# Patient Record
Sex: MALE | Race: WHITE | NOT HISPANIC OR LATINO | Employment: OTHER | URBAN - METROPOLITAN AREA
[De-identification: names, ages, dates, MRNs, and addresses within clinical notes are randomized per-mention and may not be internally consistent; named-entity substitution may affect disease eponyms.]

---

## 2018-04-17 ENCOUNTER — HISTORICAL (OUTPATIENT)
Dept: INTERNAL MEDICINE | Facility: CLINIC | Age: 53
End: 2018-04-17

## 2018-04-18 ENCOUNTER — HISTORICAL (OUTPATIENT)
Dept: INTERNAL MEDICINE | Facility: CLINIC | Age: 53
End: 2018-04-18

## 2018-04-19 ENCOUNTER — HISTORICAL (OUTPATIENT)
Dept: INTERNAL MEDICINE | Facility: CLINIC | Age: 53
End: 2018-04-19

## 2018-12-17 ENCOUNTER — HISTORICAL (OUTPATIENT)
Dept: CARDIOLOGY | Facility: HOSPITAL | Age: 53
End: 2018-12-17

## 2018-12-20 ENCOUNTER — HISTORICAL (OUTPATIENT)
Dept: RADIOLOGY | Facility: HOSPITAL | Age: 53
End: 2018-12-20

## 2019-04-02 LAB
BILIRUB SERPL-MCNC: NEGATIVE MG/DL
BLOOD URINE, POC: NEGATIVE
CLARITY, POC UA: CLEAR
COLOR, POC UA: NORMAL
GLUCOSE UR QL STRIP: NEGATIVE
KETONES UR QL STRIP: NEGATIVE
LEUKOCYTE EST, POC UA: NORMAL
NITRITE, POC UA: NEGATIVE
PH, POC UA: 7.5
PROTEIN, POC: NORMAL
SPECIFIC GRAVITY, POC UA: 1.02
UROBILINOGEN, POC UA: NORMAL

## 2019-05-06 ENCOUNTER — HISTORICAL (OUTPATIENT)
Dept: INTERNAL MEDICINE | Facility: CLINIC | Age: 54
End: 2019-05-06

## 2019-05-08 LAB — FINAL CULTURE: NO GROWTH

## 2019-05-16 ENCOUNTER — HISTORICAL (OUTPATIENT)
Dept: ENDOSCOPY | Facility: HOSPITAL | Age: 54
End: 2019-05-16

## 2021-12-10 ENCOUNTER — HISTORICAL (OUTPATIENT)
Dept: RADIOLOGY | Facility: HOSPITAL | Age: 56
End: 2021-12-10

## 2022-04-09 ENCOUNTER — HISTORICAL (OUTPATIENT)
Dept: ADMINISTRATIVE | Facility: HOSPITAL | Age: 57
End: 2022-04-09

## 2022-04-25 VITALS
HEIGHT: 70 IN | DIASTOLIC BLOOD PRESSURE: 88 MMHG | OXYGEN SATURATION: 96 % | WEIGHT: 311.75 LBS | BODY MASS INDEX: 44.63 KG/M2 | SYSTOLIC BLOOD PRESSURE: 150 MMHG

## 2022-05-02 NOTE — HISTORICAL [facility] CERNER
This is a historical note converted from Binh. Formatting and pictures may have been removed.  Please reference Binh for original formatting and attached multimedia. History of Present Illness  54 year old WM with PMH below who presents for referral from  clinic for colonoscopy following +FIT test.  ?  Patient states he?has?had?multiple colonoscopies?since 1989.? Initially was performed for abdominal pain and found polyps. ?Subsequently has had?repeated follow up colonoscopies with polypectomies last 2015 at which time 3 polyps removed.? Says he has never had a malignant polyp.? Last had EGD in 2017?for GERD symptoms?and?dysphagia,?polypectomy and dilatation was performed. ?He is followed in internal medicine clinic and had a?positive fit test?04/2018. ?He was seen subsequently scheduled for colonoscopy today.  ?  Today he has no complaints. ?He does have a history of sleep apnea and says his sats go down to the low 90s at nighttime?however he has never had any complications during his colonoscopies or surgeries.? Reports daily bowel movement.? States he completed 2 day prep last bowel movement just prior to interview yellowish clear.? Had 16 ounces water 3?a.m.?with his medications.  ?  Says he took all his regular medications yesterday including aspirin.? Only took clonazepam, HCTZ, metoprolol, sertraline, Nexium?this morning.? Also took his?Striverdi?for COPD and asthma.  ?  PMH: KAILEY, anxiety, depression, COPD,?asthma, diabetes, hypertension, OCD, gastroparesis, diverticulosis  FHx:sister?and niece with Crohns disease, brother with polyps  Social Hx:?History of smoking  SurgHx:?Sinus surgery, gallbladder surgery, knee surgery  All:?Iodine, cortisone  Meds: reviewed in chart  ?  ?  Review of Systems  neg except as above  Physical Exam  Vitals & Measurements  T:?36.6? ?C (Oral)? HR:?68(Monitored)? RR:?18? BP:?125/75? SpO2:?95%? WT:?133.9?kg?  General: Alert and oriented, No acute distress. Morbid  obesity  Eye:? Extraocular movements are intact.  HENT: Normocephalic. Obese neck  Neck: Supple  Respiratory: Lungs are clear to auscultation, Respirations are non-labored, Breath sounds are equal, Symmetrical chest wall expansion. No wheeze  Cardiovascular: Normal rate, Regular rhythm, No murmur.  Gastrointestinal: Soft, Non-tender, Non-distended  Musculoskeletal: Normal range of motion.  Integumentary: Warm, Dry, Intact.  Neurologic: No focal deficits  Assessment/Plan  1.?Encounter for diagnostic endoscopy?Z01.818  ?-informed consent obtained, colonoscopy today, procedure note to follow  -follow up results with PCP  2.?Positive FIT (fecal immunochemical test)?R19.5   Problem List/Past Medical History  Ongoing  Anxiety  Asthma  COPD (chronic obstructive pulmonary disease) with emphysema  Depression  Diabetic gastroparesis  DM (diabetes mellitus)  HTN (hypertension)  OCD - Obsessive-compulsive disorder  Historical  Asthma  DM (diabetes mellitus)  Hypertension  Procedure/Surgical History  Septoplasty or submucous resection, with or without cartilage scoring, contouring or replacement with graft (06/01/2017)  COLON POLYPS REMOVED (01/02/2017)  GALLL BLADDER REMOVED  RIGHT KNEE SCOPE   Medications  Inpatient  IVF Lactated Ringers LR Infusion 1,000 mL, 1000 mL, IV  Home  Asmanex Twisthaler 60 Dose 220 mcg/inh inhalation aerosol powder, INH, BID  aspirin 81 mg oral tablet, 81 mg= 1 tab(s), Oral, Daily  Atrovent HFA 17 mcg/inh inhalation aerosol, 2 puff(s), INH, QID  azelastine 137 mcg/inh (0.1%) nasal spray, 1 spray(s), Nasal, BID  Carafate 1 g oral tablet, 1 gm= 1 tab(s), Oral, QID  cholecalciferol 2000 intl units oral capsule, 2000 IntUnit= 1 cap(s), Oral, Daily  cloniDINE 0.1 mg oral tablet, 0.2 mg= 2 tab(s), Oral, BID  cyanocobalamin 1000 mcg oral tablet, 1000 mcg= 1 tab(s), Oral, Daily  fexofenadine 180 mg oral tablet, 180 mg= 1 tab(s), Oral, Daily  Flomax 0.4 mg oral capsule, 0.4 mg= 1 cap(s), Oral, Daily  Flonase  50 mcg/inh nasal spray, 2 spray(s), Nasal, Daily, 2 refills  fosinopril 40 mg oral tablet, 40 mg= 1 tab(s), Oral, BID  gabapentin 300 mg oral capsule, 300 mg= 1 cap(s), Oral, TID,? ?Still taking, not as prescribed: patient states he is taking BID  glipiZIDE 2.5 mg oral tablet, extended release (XL tab), 2.5 mg= 1 tab(s), Oral, BID  hydrochlorothiazide 25 mg oral tablet, 25 mg= 1 tab(s), Oral, Daily  Klonopin 1 mg oral tablet, 1 mg= 1 tab(s), Oral, TID  Lidocaine Viscous 2% mucous membrane solution, 1 anselmo, TOP, QID, PRN  metformin 1000 mg oral tablet, 1000 mg= 1 tab(s), Oral, BID  metoprolol tartrate 25 mg oral tab, 25 mg= 1 tab(s), Oral, BID  mometasone 220 mcg/inh inhalation aerosol powder, 2 puff(s), INH, BID  montelukast 10 mg oral TABLET, 10 mg= 1 tab(s), Oral, Daily  NexIUM 40 mg oral delayed release capsule, 40 mg, Oral, Daily  QUEtiapine 100 mg oral tablet, See Instructions  sertraline 100 mg oral tablet, 200 mg= 2 tab(s), Oral, Daily  simvastatin 40 mg oral tablet, 40 mg= 1 tab(s), Oral, Once a day (at bedtime)  Striverdi Respimat 2.5 mcg/inh inhalation aerosol, 2 puff(s), INH, BID  testosterone 20.25 mg/actuation (1.62%) transdermal gel, 4 pump, TOP, qAM  valproic acid 250 mg oral capsule, 1 tab, Oral, BID  Xopenex 1.25 mg/3 mL inhalation solution, 1.25 mg= 3 mL, NEB, TID  Allergies  Cortisone+  iodine  Social History  Alcohol - Denies Alcohol Use, 07/06/2014  Never, 05/16/2019  Employment/School  Unemployed, 05/10/2016  Home/Environment  Lives with Spouse. Home equipment: Glucose monitoring, Respiratory treatments, AL[employer] MACHINE. Alcohol abuse in household: No. Substance abuse in household: No. Smoker in household: No. Feels unsafe at home: No. Safe place to go: Yes. Family/Friends available for support: Yes., 05/10/2016  Nutrition/Health  Regular, AVOID RAW VEGATABLES AND FRUITS, Caffeine intake amount: 1-2 CUPS OF COOFFEE DAILY. Wants to lose weight: Yes. Sleeping concerns: Yes. Feels highly  stressed: Yes., 05/10/2016  Substance Abuse - Denies Substance Abuse, 07/06/2014  Never, 05/16/2019  Tobacco - Denies Tobacco Use, 07/06/2014  Former smoker, quit more than 30 days ago, Cigarettes, N/A, 05/16/2019  Family History  Asthma.: Mother and Father.  Diabetes: Mother.  Hypertension.: Mother and Father.  Mitral valve disorder.: Mother and Father.  Renal disease.: Father.  Immunizations  Vaccine Date Status   influenza virus vaccine, inactivated 09/25/2018 Given

## 2022-09-15 ENCOUNTER — HISTORICAL (OUTPATIENT)
Dept: ADMINISTRATIVE | Facility: HOSPITAL | Age: 57
End: 2022-09-15

## 2023-08-24 ENCOUNTER — HOSPITAL ENCOUNTER (EMERGENCY)
Facility: HOSPITAL | Age: 58
Discharge: HOME | End: 2023-08-24
Attending: INTERNAL MEDICINE
Payer: OTHER GOVERNMENT

## 2023-08-24 VITALS
WEIGHT: 305.75 LBS | OXYGEN SATURATION: 95 % | HEIGHT: 69 IN | RESPIRATION RATE: 18 BRPM | SYSTOLIC BLOOD PRESSURE: 127 MMHG | TEMPERATURE: 99 F | BODY MASS INDEX: 45.29 KG/M2 | HEART RATE: 98 BPM | DIASTOLIC BLOOD PRESSURE: 88 MMHG

## 2023-08-24 DIAGNOSIS — N30.90 CYSTITIS WITHOUT HEMATURIA: Primary | ICD-10-CM

## 2023-08-24 DIAGNOSIS — L03.818 CELLULITIS OF OTHER SPECIFIED SITE: ICD-10-CM

## 2023-08-24 LAB
ALBUMIN SERPL-MCNC: 3.8 G/DL (ref 3.5–5)
ALBUMIN/GLOB SERPL: 1 RATIO (ref 1.1–2)
ALP SERPL-CCNC: 71 UNIT/L (ref 40–150)
ALT SERPL-CCNC: 28 UNIT/L (ref 0–55)
APPEARANCE UR: CLEAR
AST SERPL-CCNC: 20 UNIT/L (ref 5–34)
BACTERIA #/AREA URNS AUTO: ABNORMAL /HPF
BASOPHILS # BLD AUTO: 0.08 X10(3)/MCL
BASOPHILS NFR BLD AUTO: 0.5 %
BILIRUB SERPL-MCNC: 0.5 MG/DL
BILIRUB UR QL STRIP.AUTO: NEGATIVE
BUN SERPL-MCNC: 10.5 MG/DL (ref 8.4–25.7)
C TRACH DNA SPEC QL NAA+PROBE: NOT DETECTED
CALCIUM SERPL-MCNC: 9.4 MG/DL (ref 8.4–10.2)
CHLORIDE SERPL-SCNC: 99 MMOL/L (ref 98–107)
CO2 SERPL-SCNC: 25 MMOL/L (ref 22–29)
COLOR UR: ABNORMAL
CREAT SERPL-MCNC: 1.13 MG/DL (ref 0.73–1.18)
EOSINOPHIL # BLD AUTO: 0.39 X10(3)/MCL (ref 0–0.9)
EOSINOPHIL NFR BLD AUTO: 2.2 %
ERYTHROCYTE [DISTWIDTH] IN BLOOD BY AUTOMATED COUNT: 16.9 % (ref 11.5–17)
GFR SERPLBLD CREATININE-BSD FMLA CKD-EPI: >60 MLS/MIN/1.73/M2
GLOBULIN SER-MCNC: 3.9 GM/DL (ref 2.4–3.5)
GLUCOSE SERPL-MCNC: 233 MG/DL (ref 74–100)
GLUCOSE UR QL STRIP.AUTO: ABNORMAL
HCT VFR BLD AUTO: 42.2 % (ref 42–52)
HGB BLD-MCNC: 12.4 G/DL (ref 14–18)
HYALINE CASTS #/AREA URNS LPF: ABNORMAL /LPF
IMM GRANULOCYTES # BLD AUTO: 0.07 X10(3)/MCL (ref 0–0.04)
IMM GRANULOCYTES NFR BLD AUTO: 0.4 %
KETONES UR QL STRIP.AUTO: NEGATIVE
LEUKOCYTE ESTERASE UR QL STRIP.AUTO: 500
LYMPHOCYTES # BLD AUTO: 1.77 X10(3)/MCL (ref 0.6–4.6)
LYMPHOCYTES NFR BLD AUTO: 10 %
MCH RBC QN AUTO: 21.9 PG (ref 27–31)
MCHC RBC AUTO-ENTMCNC: 29.4 G/DL (ref 33–36)
MCV RBC AUTO: 74.6 FL (ref 80–94)
MONOCYTES # BLD AUTO: 2.02 X10(3)/MCL (ref 0.1–1.3)
MONOCYTES NFR BLD AUTO: 11.4 %
MUCOUS THREADS URNS QL MICRO: ABNORMAL /LPF
N GONORRHOEA DNA SPEC QL NAA+PROBE: NOT DETECTED
NEUTROPHILS # BLD AUTO: 13.41 X10(3)/MCL (ref 2.1–9.2)
NEUTROPHILS NFR BLD AUTO: 75.5 %
NITRITE UR QL STRIP.AUTO: ABNORMAL
NRBC BLD AUTO-RTO: 0 %
PH UR STRIP.AUTO: 7 [PH]
PLATELET # BLD AUTO: 336 X10(3)/MCL (ref 130–400)
PMV BLD AUTO: 9.9 FL (ref 7.4–10.4)
POTASSIUM SERPL-SCNC: 4.5 MMOL/L (ref 3.5–5.1)
PROT SERPL-MCNC: 7.7 GM/DL (ref 6.4–8.3)
PROT UR QL STRIP.AUTO: NEGATIVE
RBC # BLD AUTO: 5.66 X10(6)/MCL (ref 4.7–6.1)
RBC #/AREA URNS AUTO: ABNORMAL /HPF
RBC UR QL AUTO: NEGATIVE
SODIUM SERPL-SCNC: 132 MMOL/L (ref 136–145)
SOURCE (OHS): NORMAL
SP GR UR STRIP.AUTO: 1.01
SQUAMOUS #/AREA URNS LPF: ABNORMAL /HPF
UROBILINOGEN UR STRIP-ACNC: NORMAL
WBC # SPEC AUTO: 17.74 X10(3)/MCL (ref 4.5–11.5)
WBC #/AREA URNS AUTO: ABNORMAL /HPF

## 2023-08-24 PROCEDURE — 80053 COMPREHEN METABOLIC PANEL: CPT | Performed by: PHYSICIAN ASSISTANT

## 2023-08-24 PROCEDURE — 96365 THER/PROPH/DIAG IV INF INIT: CPT

## 2023-08-24 PROCEDURE — 85025 COMPLETE CBC W/AUTO DIFF WBC: CPT | Performed by: PHYSICIAN ASSISTANT

## 2023-08-24 PROCEDURE — 25000003 PHARM REV CODE 250

## 2023-08-24 PROCEDURE — 63600175 PHARM REV CODE 636 W HCPCS

## 2023-08-24 PROCEDURE — 87591 N.GONORRHOEAE DNA AMP PROB: CPT | Performed by: PHYSICIAN ASSISTANT

## 2023-08-24 PROCEDURE — 99284 EMERGENCY DEPT VISIT MOD MDM: CPT | Mod: 25

## 2023-08-24 PROCEDURE — 81001 URINALYSIS AUTO W/SCOPE: CPT | Performed by: PHYSICIAN ASSISTANT

## 2023-08-24 PROCEDURE — 87088 URINE BACTERIA CULTURE: CPT | Performed by: PHYSICIAN ASSISTANT

## 2023-08-24 PROCEDURE — 87186 SC STD MICRODIL/AGAR DIL: CPT | Performed by: PHYSICIAN ASSISTANT

## 2023-08-24 RX ORDER — SULFAMETHOXAZOLE AND TRIMETHOPRIM 800; 160 MG/1; MG/1
1 TABLET ORAL EVERY 12 HOURS
Qty: 20 TABLET | Refills: 0 | Status: SHIPPED | OUTPATIENT
Start: 2023-08-24 | End: 2023-09-03

## 2023-08-24 RX ADMIN — SODIUM CHLORIDE, POTASSIUM CHLORIDE, SODIUM LACTATE AND CALCIUM CHLORIDE 500 ML: 600; 310; 30; 20 INJECTION, SOLUTION INTRAVENOUS at 04:08

## 2023-08-24 RX ADMIN — PIPERACILLIN AND TAZOBACTAM 4.5 G: 4; .5 INJECTION, POWDER, LYOPHILIZED, FOR SOLUTION INTRAVENOUS; PARENTERAL at 05:08
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
## 2023-08-26 LAB — BACTERIA UR CULT: ABNORMAL

## 2024-06-24 ENCOUNTER — HOSPITAL ENCOUNTER (EMERGENCY)
Facility: HOSPITAL | Age: 59
Discharge: HOME | End: 2024-06-24
Attending: FAMILY MEDICINE
Payer: OTHER GOVERNMENT

## 2024-06-24 VITALS
HEIGHT: 69 IN | SYSTOLIC BLOOD PRESSURE: 131 MMHG | TEMPERATURE: 99 F | OXYGEN SATURATION: 94 % | BODY MASS INDEX: 45.29 KG/M2 | WEIGHT: 305.75 LBS | HEART RATE: 95 BPM | RESPIRATION RATE: 23 BRPM | DIASTOLIC BLOOD PRESSURE: 78 MMHG

## 2024-06-24 DIAGNOSIS — K21.9 GASTROESOPHAGEAL REFLUX DISEASE, UNSPECIFIED WHETHER ESOPHAGITIS PRESENT: ICD-10-CM

## 2024-06-24 DIAGNOSIS — R07.89 ATYPICAL CHEST PAIN: Primary | ICD-10-CM

## 2024-06-24 DIAGNOSIS — I10 HYPERTENSION: ICD-10-CM

## 2024-06-24 LAB
ALBUMIN SERPL-MCNC: 3.6 G/DL (ref 3.5–5)
ALBUMIN/GLOB SERPL: 1.1 RATIO (ref 1.1–2)
ALP SERPL-CCNC: 81 UNIT/L (ref 40–150)
ALT SERPL-CCNC: 26 UNIT/L (ref 0–55)
ANION GAP SERPL CALC-SCNC: 10 MEQ/L
AST SERPL-CCNC: 23 UNIT/L (ref 5–34)
BASOPHILS # BLD AUTO: 0.06 X10(3)/MCL
BASOPHILS NFR BLD AUTO: 0.6 %
BILIRUB SERPL-MCNC: 0.3 MG/DL
BUN SERPL-MCNC: 10.8 MG/DL (ref 8.4–25.7)
CALCIUM SERPL-MCNC: 9.3 MG/DL (ref 8.4–10.2)
CHLORIDE SERPL-SCNC: 100 MMOL/L (ref 98–107)
CO2 SERPL-SCNC: 23 MMOL/L (ref 22–29)
CREAT SERPL-MCNC: 0.89 MG/DL (ref 0.73–1.18)
CREAT/UREA NIT SERPL: 12
EOSINOPHIL # BLD AUTO: 0.43 X10(3)/MCL (ref 0–0.9)
EOSINOPHIL NFR BLD AUTO: 4.6 %
ERYTHROCYTE [DISTWIDTH] IN BLOOD BY AUTOMATED COUNT: 18.5 % (ref 11.5–17)
GFR SERPLBLD CREATININE-BSD FMLA CKD-EPI: >60 ML/MIN/1.73/M2
GLOBULIN SER-MCNC: 3.4 GM/DL (ref 2.4–3.5)
GLUCOSE SERPL-MCNC: 206 MG/DL (ref 74–100)
HCT VFR BLD AUTO: 40.4 % (ref 42–52)
HGB BLD-MCNC: 12.4 G/DL (ref 14–18)
IMM GRANULOCYTES # BLD AUTO: 0.06 X10(3)/MCL (ref 0–0.04)
IMM GRANULOCYTES NFR BLD AUTO: 0.6 %
LIPASE SERPL-CCNC: 38 U/L
LYMPHOCYTES # BLD AUTO: 2.9 X10(3)/MCL (ref 0.6–4.6)
LYMPHOCYTES NFR BLD AUTO: 31.3 %
MAGNESIUM SERPL-MCNC: 1.8 MG/DL (ref 1.6–2.6)
MCH RBC QN AUTO: 22.2 PG (ref 27–31)
MCHC RBC AUTO-ENTMCNC: 30.7 G/DL (ref 33–36)
MCV RBC AUTO: 72.3 FL (ref 80–94)
MONOCYTES # BLD AUTO: 0.98 X10(3)/MCL (ref 0.1–1.3)
MONOCYTES NFR BLD AUTO: 10.6 %
NEUTROPHILS # BLD AUTO: 4.83 X10(3)/MCL (ref 2.1–9.2)
NEUTROPHILS NFR BLD AUTO: 52.3 %
NRBC BLD AUTO-RTO: 0 %
OHS QRS DURATION: 92 MS
OHS QRS DURATION: 94 MS
OHS QTC CALCULATION: 418 MS
OHS QTC CALCULATION: 426 MS
PLATELET # BLD AUTO: 343 X10(3)/MCL (ref 130–400)
PMV BLD AUTO: 10.8 FL (ref 7.4–10.4)
POCT GLUCOSE: 167 MG/DL (ref 70–110)
POTASSIUM SERPL-SCNC: 4.5 MMOL/L (ref 3.5–5.1)
PROT SERPL-MCNC: 7 GM/DL (ref 6.4–8.3)
RBC # BLD AUTO: 5.59 X10(6)/MCL (ref 4.7–6.1)
SODIUM SERPL-SCNC: 133 MMOL/L (ref 136–145)
TROPONIN I SERPL-MCNC: <0.01 NG/ML (ref 0–0.04)
WBC # BLD AUTO: 9.26 X10(3)/MCL (ref 4.5–11.5)

## 2024-06-24 PROCEDURE — 93005 ELECTROCARDIOGRAM TRACING: CPT

## 2024-06-24 PROCEDURE — 83690 ASSAY OF LIPASE: CPT | Performed by: FAMILY MEDICINE

## 2024-06-24 PROCEDURE — 82962 GLUCOSE BLOOD TEST: CPT

## 2024-06-24 PROCEDURE — 25000003 PHARM REV CODE 250: Performed by: FAMILY MEDICINE

## 2024-06-24 PROCEDURE — 80053 COMPREHEN METABOLIC PANEL: CPT | Performed by: FAMILY MEDICINE

## 2024-06-24 PROCEDURE — 84484 ASSAY OF TROPONIN QUANT: CPT | Performed by: FAMILY MEDICINE

## 2024-06-24 PROCEDURE — 83735 ASSAY OF MAGNESIUM: CPT | Performed by: FAMILY MEDICINE

## 2024-06-24 PROCEDURE — 96360 HYDRATION IV INFUSION INIT: CPT

## 2024-06-24 PROCEDURE — 99284 EMERGENCY DEPT VISIT MOD MDM: CPT | Mod: 25

## 2024-06-24 PROCEDURE — 85025 COMPLETE CBC W/AUTO DIFF WBC: CPT | Performed by: FAMILY MEDICINE

## 2024-06-24 RX ORDER — PANTOPRAZOLE SODIUM 20 MG/1
20 TABLET, DELAYED RELEASE ORAL DAILY
Qty: 30 TABLET | Refills: 0 | Status: SHIPPED | OUTPATIENT
Start: 2024-06-24 | End: 2024-07-24

## 2024-06-24 RX ORDER — PROCHLORPERAZINE EDISYLATE 5 MG/ML
10 INJECTION INTRAMUSCULAR; INTRAVENOUS
Status: DISCONTINUED | OUTPATIENT
Start: 2024-06-24 | End: 2024-06-24

## 2024-06-24 RX ORDER — DIPHENHYDRAMINE HYDROCHLORIDE 50 MG/ML
12.5 INJECTION INTRAMUSCULAR; INTRAVENOUS
Status: DISCONTINUED | OUTPATIENT
Start: 2024-06-24 | End: 2024-06-24

## 2024-06-24 RX ORDER — LIDOCAINE HYDROCHLORIDE 20 MG/ML
10 SOLUTION OROPHARYNGEAL
Status: COMPLETED | OUTPATIENT
Start: 2024-06-24 | End: 2024-06-24

## 2024-06-24 RX ORDER — ACETAMINOPHEN 500 MG
1000 TABLET ORAL
Status: COMPLETED | OUTPATIENT
Start: 2024-06-24 | End: 2024-06-24

## 2024-06-24 RX ADMIN — SODIUM CHLORIDE 1000 ML: 9 INJECTION, SOLUTION INTRAVENOUS at 03:06

## 2024-06-24 RX ADMIN — ACETAMINOPHEN 1000 MG: 500 TABLET ORAL at 03:06

## 2024-06-24 RX ADMIN — LIDOCAINE HYDROCHLORIDE 10 ML: 20 SOLUTION ORAL at 03:06

## 2024-06-24 RX ADMIN — ALUMINUM HYDROXIDE AND MAGNESIUM HYDROXIDE 30 ML: 200; 200 SUSPENSION ORAL at 03:06

## 2024-06-24 NOTE — ED PROVIDER NOTES
"Encounter Date: 6/24/2024       History     Chief Complaint   Patient presents with    Headache    Nausea    Hypertension     States after eating hot dogs last night had headache.  Took 650mg Aspirin.  States woke this am with head pounding worse, palpitations and nausea.       59 y.o. male presenting to the ER with complaints of elevated blood pressure and palpitations.  Patient reports awakening with palpitations.  When he checked his blood pressure, he noted it was elevated and became concerned.  Denies dyspnea.  Patient reports eating hot dogs yesterday, and requesting a "GI Cocktail" to help with heartburn type symptoms.  Patient reports that he receives testosterone injections as well, last injection 3 days prior.    The history is provided by the patient.     Review of patient's allergies indicates:   Allergen Reactions    Albuterol      Other reaction(s): Not Indicated    Budesonide-formoterol     Cortisone      Other reaction(s): Irregular tachycardia  Other reaction(s): Irregular tachycardia      Hydrocortisone     Iodine     Metoclopramide Anxiety    Ventolin refill Anxiety and Palpitations     Past Medical History:   Diagnosis Date    Anxiety disorder, unspecified     Asthma     COPD (chronic obstructive pulmonary disease)     Diabetes mellitus     Hypertension      Past Surgical History:   Procedure Laterality Date    CHOLECYSTECTOMY      CIRCUMCISION      KNEE ARTHROSCOPY Right      No family history on file.  Social History     Tobacco Use    Smoking status: Former     Types: Cigarettes    Smokeless tobacco: Never   Substance Use Topics    Alcohol use: Not Currently    Drug use: Yes     Review of Systems   Constitutional:  Negative for chills, fatigue and fever.   HENT:  Negative for ear pain, rhinorrhea and sore throat.    Eyes:  Negative for photophobia and pain.   Respiratory:  Negative for cough, shortness of breath and wheezing.    Cardiovascular:  Positive for chest pain and palpitations. "   Gastrointestinal:  Positive for nausea. Negative for abdominal pain, diarrhea and vomiting.   Genitourinary:  Negative for dysuria.   Neurological:  Positive for headaches. Negative for dizziness and weakness.   All other systems reviewed and are negative.      Physical Exam     Initial Vitals [06/24/24 0259]   BP Pulse Resp Temp SpO2   (!) 215/92 94 18 99 °F (37.2 °C) 97 %      MAP       --         Physical Exam    Nursing note and vitals reviewed.  Constitutional: He appears well-developed and well-nourished.   HENT:   Head: Normocephalic and atraumatic.   Mouth/Throat: Oropharynx is clear and moist.   Eyes: EOM are normal. Pupils are equal, round, and reactive to light.   Neck: Neck supple.   Normal range of motion.  Cardiovascular:  Normal rate, regular rhythm and normal heart sounds.     Exam reveals no gallop and no friction rub.       No murmur heard.  Pulmonary/Chest: Breath sounds normal. No respiratory distress. He has no wheezes. He has no rhonchi. He has no rales.   Abdominal: Abdomen is soft. Bowel sounds are normal. He exhibits no distension. There is no abdominal tenderness.   Musculoskeletal:         General: Normal range of motion.      Cervical back: Normal range of motion and neck supple.     Neurological: He is alert and oriented to person, place, and time. He has normal strength.   Skin: Skin is warm and dry.   Psychiatric: He has a normal mood and affect. His behavior is normal. Judgment and thought content normal.         ED Course   Procedures  Labs Reviewed   COMPREHENSIVE METABOLIC PANEL - Abnormal; Notable for the following components:       Result Value    Sodium 133 (*)     Glucose 206 (*)     All other components within normal limits   CBC WITH DIFFERENTIAL - Abnormal; Notable for the following components:    Hgb 12.4 (*)     Hct 40.4 (*)     MCV 72.3 (*)     MCH 22.2 (*)     MCHC 30.7 (*)     RDW 18.5 (*)     MPV 10.8 (*)     IG# 0.06 (*)     All other components within normal limits    POCT GLUCOSE - Abnormal; Notable for the following components:    POCT Glucose 167 (*)     All other components within normal limits   LIPASE - Normal   TROPONIN I - Normal   MAGNESIUM - Normal   CBC W/ AUTO DIFFERENTIAL    Narrative:     The following orders were created for panel order CBC Auto Differential.  Procedure                               Abnormality         Status                     ---------                               -----------         ------                     CBC with Differential[[id number]]        Abnormal            Final result                 Please view results for these tests on the individual orders.   POCT GLUCOSE MONITORING CONTINUOUS     EKG Readings: (Independently Interpreted)   My Independent EKG Interpretation  06/24/2024 3:13 AM  Rate: 99 bpm  Rhythm: Sinus  Axis: Normal  Ectopy: Frequent PVCs  Intervals: Normal  ST Changes: None  Impression: Normal sinus rhythm with frequent PVCs.           Imaging Results    None          Medications   aluminum-magnesium hydroxide 200-200 mg/5 mL suspension 30 mL (30 mLs Oral Given 6/24/24 0331)   LIDOcaine viscous HCl 2% oral solution 10 mL (10 mLs Oral Given 6/24/24 0331)   sodium chloride 0.9% bolus 1,000 mL 1,000 mL (1,000 mLs Intravenous New Bag 6/24/24 7154)   acetaminophen tablet 1,000 mg (1,000 mg Oral Given 6/24/24 5065)     Medical Decision Making  59-year-old gentleman presents emergency room complaints of hypertension, palpitations, nonspecific chest pain.  Will obtain laboratory evaluation including cardiac workup, and will provide patient with GI cocktail to help with symptoms.  Will continue to monitor     Differential diagnosis:  Atypical chest pain, NSTEMI, electrolyte abnormality, gastroesophageal reflux, pancreatitis    Amount and/or Complexity of Data Reviewed  Labs: ordered. Decision-making details documented in ED Course.    Risk  OTC drugs.  Prescription drug management.               ED Course as of 06/24/24 6170    Mon Jun 24, 2024   0500 Sodium(!): 133 [MW]   0500 Potassium: 4.5 [MW]   0500 Chloride: 100 [MW]   0500 CO2: 23 [MW]   0500 Glucose(!): 206 [MW]   0500 BUN: 10.8 [MW]   0500 Creatinine: 0.89 [MW]   0500 Lipase: 38 [MW]   0500 Troponin I: <0.010 [MW]   0500 Magnesium : 1.80 [MW]   0523 WBC: 9.26 [MW]   0523 Hemoglobin(!): 12.4 [MW]   0523 Hematocrit(!): 40.4 [MW]   0523 Platelet Count: 343 [MW]   0523 Sodium(!): 133 [MW]   0523 Potassium: 4.5 [MW]   0523 CO2: 23 [MW]   0523 Glucose(!): 206 [MW]   0523 BUN: 10.8 [MW]   0523 Creatinine: 0.89 [MW]   0523 Calcium: 9.3 [MW]   0523 ALP: 81 [MW]   0523 ALT: 26 [MW]   0523 AST: 23 [MW]   0523 Anion Gap: 10.0 [MW]   0523 Troponin I: <0.010 [MW]   0540 Feeling much improved; stable for discharge to home.  ER precautions for any acute worsening. [MW]      ED Course User Index  [MW] Rene Roberson MD                           Clinical Impression:  Final diagnoses:  [I10] Hypertension  [R07.89] Atypical chest pain (Primary)  [K21.9] Gastroesophageal reflux disease, unspecified whether esophagitis present          ED Disposition Condition    Discharge Stable          ED Prescriptions       Medication Sig Dispense Start Date End Date Auth. Provider    pantoprazole (PROTONIX) 20 MG tablet Take 1 tablet (20 mg total) by mouth once daily. 30 tablet 6/24/2024 7/24/2024 Rene Roberson MD          Follow-up Information       Follow up With Specialties Details Why Contact Info [facility] Emergency Medicine  As needed, If symptoms worsen [address] [facility] [id number] [phone]    Primary Care Physician  In 5 days               Rene Roberson MD  06/24/24 0753